# Patient Record
Sex: MALE | Race: WHITE | Employment: OTHER | URBAN - METROPOLITAN AREA
[De-identification: names, ages, dates, MRNs, and addresses within clinical notes are randomized per-mention and may not be internally consistent; named-entity substitution may affect disease eponyms.]

---

## 2017-09-05 ENCOUNTER — APPOINTMENT (OUTPATIENT)
Dept: RADIOLOGY | Facility: IMAGING CENTER | Age: 67
End: 2017-09-05
Attending: FAMILY MEDICINE
Payer: MEDICARE

## 2017-09-05 ENCOUNTER — OFFICE VISIT (OUTPATIENT)
Dept: URGENT CARE | Facility: CLINIC | Age: 67
End: 2017-09-05
Payer: MEDICARE

## 2017-09-05 VITALS
OXYGEN SATURATION: 96 % | BODY MASS INDEX: 29.06 KG/M2 | TEMPERATURE: 98.5 F | HEART RATE: 65 BPM | DIASTOLIC BLOOD PRESSURE: 84 MMHG | SYSTOLIC BLOOD PRESSURE: 138 MMHG | WEIGHT: 203 LBS | HEIGHT: 70 IN

## 2017-09-05 DIAGNOSIS — G89.29 CHRONIC PAIN OF LEFT KNEE: ICD-10-CM

## 2017-09-05 DIAGNOSIS — M25.562 CHRONIC PAIN OF LEFT KNEE: ICD-10-CM

## 2017-09-05 DIAGNOSIS — B07.9 VIRAL WARTS, UNSPECIFIED TYPE: ICD-10-CM

## 2017-09-05 PROCEDURE — 73564 X-RAY EXAM KNEE 4 OR MORE: CPT | Mod: TC,LT | Performed by: FAMILY MEDICINE

## 2017-09-05 PROCEDURE — 99214 OFFICE O/P EST MOD 30 MIN: CPT | Performed by: FAMILY MEDICINE

## 2017-09-05 NOTE — PROGRESS NOTES
"Subjective:      Chief Complaint   Patient presents with   • Knee Injury     bumped knee [id number] ago still having alot of pain   • Other     \"has a wart on back of R arm\"               Knee Pain           Complains of left knee pain for 5 months.   He bumped his knee against some furniture and has had pain ever since.      Describes pain as dull and achy.  Pain does not radiate.  Pain is somewhat better with motrin, worse with walking.         #2  Has wart on back of rt arm x 3 months.              Social History   Substance Use Topics   • Smoking status: Never Smoker   • Smokeless tobacco: Never Used   • Alcohol use Not on file         No past medical history on file.      Current Outpatient Prescriptions on File Prior to Visit   Medication Sig Dispense Refill   • tamsulosin (FLOMAX) 0.4 MG capsule Take 1 Cap by mouth ONE-HALF HOUR AFTER BREAKFAST. 30 Cap 0   • aspirin (ASA) 81 MG CHEW Take 81 mg by mouth every day.       No current facility-administered medications on file prior to visit.                Review of Systems   Constitutional: Negative for fever and malaise/fatigue.   Respiratory: Negative for shortness of breath.    Cardiovascular: Negative for chest pain.   Neurological: Negative for tingling.   All other systems reviewed and are negative.         Objective:     Blood pressure 138/84, pulse 65, temperature 36.9 °C (98.5 °F), height 1.778 m (5' 10\"), weight 92.1 kg (203 lb), SpO2 96 %.    Physical Exam   Constitutional: patient is oriented to person, place, and time.  Patient appears well-developed and well-nourished. No distress.   HENT:   Head: Normocephalic and atraumatic.   Eyes: Conjunctivae are normal.   Cardiovascular: Normal rate.    Pulmonary/Chest: Effort normal.   Neurological: She is alert and oriented to person, place, and time.   musculoskeletal -  Knee - tender over  .   There is no swelling.   There is no erythema.  There is no crepitus.  Varus and valgus stress tests negative. " Lachman, anterior/posterior drawer test negative  Strength: Flexion 5/5, extension 5/5     Skin: Skin is warm. Patient is not diaphoretic. No erythema.  there is a small, flat wart over rt triceps area.   Nursing note and vitals reviewed.       Narrative       9/5/2017 10:33 AM    HISTORY/REASON FOR EXAM:  Left knee pain for 2 days      TECHNIQUE/EXAM DESCRIPTION AND NUMBER OF VIEWS:  4 views of the LEFT knee.    COMPARISON: None    FINDINGS:  Bone density is normal.  There is fragmentation of the lateral edge of the patella on the sunrise view. No other fracture or dislocation is noted. There is no evidence of arthropathy.  There is small joint effusion.   Impression       1.  Possible old versus subacute or new fracture through the lateral edge of the patella on the sunrise view. Normal variant nonunion of accessory ossification center is also possible.    2.  Small joint effusion.    3.  No other fractures identified.   Reading Provider Reading Date   Dilan Musa M.D. Sep 5, 2017   Signing Provider Signing Date Signing Time   Dilan Musa M.D. Sep 5, 2017 10:48 AM            Assessment/Plan:     1. Chronic pain of left knee  Xray personally reviewed.  Possible old versus subacute or new fracture through the lateral edge of the patella on the sunrise view.   rx voltaren gel  Referred to sports med    2. Viral wart    Cryotreatment with liquid nitrogen to lesion on rt arm